# Patient Record
Sex: FEMALE | Race: WHITE | NOT HISPANIC OR LATINO | Employment: UNEMPLOYED | URBAN - METROPOLITAN AREA
[De-identification: names, ages, dates, MRNs, and addresses within clinical notes are randomized per-mention and may not be internally consistent; named-entity substitution may affect disease eponyms.]

---

## 2022-10-29 PROBLEM — R56.9 SEIZURES: Status: ACTIVE | Noted: 2018-02-24

## 2022-10-29 PROBLEM — N20.0 NEPHROLITHIASIS: Status: ACTIVE | Noted: 2022-10-29

## 2022-10-29 PROBLEM — N20.1 URETERAL STONE: Status: ACTIVE | Noted: 2022-10-29

## 2024-01-20 ENCOUNTER — HOSPITAL ENCOUNTER (EMERGENCY)
Facility: HOSPITAL | Age: 44
Discharge: HOME | End: 2024-01-21
Attending: STUDENT IN AN ORGANIZED HEALTH CARE EDUCATION/TRAINING PROGRAM

## 2024-01-20 DIAGNOSIS — N20.0 NEPHROLITHIASIS: Primary | ICD-10-CM

## 2024-01-20 LAB
ALBUMIN SERPL BCP-MCNC: 4.4 G/DL (ref 3.5–5.2)
ALP SERPL-CCNC: 69 U/L (ref 55–135)
ALT SERPL W/O P-5'-P-CCNC: 13 U/L (ref 10–44)
ANION GAP SERPL CALC-SCNC: 11 MMOL/L (ref 8–16)
AST SERPL-CCNC: 19 U/L (ref 10–40)
BASOPHILS # BLD AUTO: 0.06 K/UL (ref 0–0.2)
BASOPHILS NFR BLD: 0.4 % (ref 0–1.9)
BILIRUB SERPL-MCNC: 0.5 MG/DL (ref 0.1–1)
BUN SERPL-MCNC: 11 MG/DL (ref 6–20)
CALCIUM SERPL-MCNC: 10.1 MG/DL (ref 8.7–10.5)
CHLORIDE SERPL-SCNC: 108 MMOL/L (ref 95–110)
CO2 SERPL-SCNC: 20 MMOL/L (ref 23–29)
CREAT SERPL-MCNC: 0.9 MG/DL (ref 0.5–1.4)
DIFFERENTIAL METHOD BLD: ABNORMAL
EOSINOPHIL # BLD AUTO: 0.1 K/UL (ref 0–0.5)
EOSINOPHIL NFR BLD: 0.4 % (ref 0–8)
ERYTHROCYTE [DISTWIDTH] IN BLOOD BY AUTOMATED COUNT: 13.2 % (ref 11.5–14.5)
EST. GFR  (NO RACE VARIABLE): >60 ML/MIN/1.73 M^2
GLUCOSE SERPL-MCNC: 114 MG/DL (ref 70–110)
HCT VFR BLD AUTO: 37.5 % (ref 37–48.5)
HGB BLD-MCNC: 12.4 G/DL (ref 12–16)
IMM GRANULOCYTES # BLD AUTO: 0.06 K/UL (ref 0–0.04)
IMM GRANULOCYTES NFR BLD AUTO: 0.4 % (ref 0–0.5)
LIPASE SERPL-CCNC: 36 U/L (ref 4–60)
LYMPHOCYTES # BLD AUTO: 2.8 K/UL (ref 1–4.8)
LYMPHOCYTES NFR BLD: 18.3 % (ref 18–48)
MCH RBC QN AUTO: 29 PG (ref 27–31)
MCHC RBC AUTO-ENTMCNC: 33.1 G/DL (ref 32–36)
MCV RBC AUTO: 88 FL (ref 82–98)
MONOCYTES # BLD AUTO: 0.6 K/UL (ref 0.3–1)
MONOCYTES NFR BLD: 4 % (ref 4–15)
NEUTROPHILS # BLD AUTO: 11.5 K/UL (ref 1.8–7.7)
NEUTROPHILS NFR BLD: 76.5 % (ref 38–73)
NRBC BLD-RTO: 0 /100 WBC
PLATELET # BLD AUTO: 314 K/UL (ref 150–450)
PMV BLD AUTO: 11.5 FL (ref 9.2–12.9)
POTASSIUM SERPL-SCNC: 3.2 MMOL/L (ref 3.5–5.1)
PROT SERPL-MCNC: 8.2 G/DL (ref 6–8.4)
RBC # BLD AUTO: 4.28 M/UL (ref 4–5.4)
SODIUM SERPL-SCNC: 139 MMOL/L (ref 136–145)
WBC # BLD AUTO: 15.05 K/UL (ref 3.9–12.7)

## 2024-01-20 PROCEDURE — 81025 URINE PREGNANCY TEST: CPT | Performed by: PHYSICIAN ASSISTANT

## 2024-01-20 PROCEDURE — 63600175 PHARM REV CODE 636 W HCPCS: Performed by: PHYSICIAN ASSISTANT

## 2024-01-20 PROCEDURE — 96375 TX/PRO/DX INJ NEW DRUG ADDON: CPT

## 2024-01-20 PROCEDURE — 96374 THER/PROPH/DIAG INJ IV PUSH: CPT

## 2024-01-20 PROCEDURE — 83690 ASSAY OF LIPASE: CPT | Performed by: PHYSICIAN ASSISTANT

## 2024-01-20 PROCEDURE — 99285 EMERGENCY DEPT VISIT HI MDM: CPT | Mod: 25

## 2024-01-20 PROCEDURE — 80053 COMPREHEN METABOLIC PANEL: CPT | Performed by: PHYSICIAN ASSISTANT

## 2024-01-20 PROCEDURE — 85025 COMPLETE CBC W/AUTO DIFF WBC: CPT | Performed by: PHYSICIAN ASSISTANT

## 2024-01-20 RX ORDER — MORPHINE SULFATE 4 MG/ML
4 INJECTION, SOLUTION INTRAMUSCULAR; INTRAVENOUS
Status: COMPLETED | OUTPATIENT
Start: 2024-01-20 | End: 2024-01-20

## 2024-01-20 RX ORDER — ONDANSETRON HYDROCHLORIDE 2 MG/ML
4 INJECTION, SOLUTION INTRAVENOUS
Status: COMPLETED | OUTPATIENT
Start: 2024-01-20 | End: 2024-01-20

## 2024-01-20 RX ORDER — HYDROMORPHONE HYDROCHLORIDE 1 MG/ML
1 INJECTION, SOLUTION INTRAMUSCULAR; INTRAVENOUS; SUBCUTANEOUS
Status: COMPLETED | OUTPATIENT
Start: 2024-01-20 | End: 2024-01-20

## 2024-01-20 RX ADMIN — HYDROMORPHONE HYDROCHLORIDE 1 MG: 1 INJECTION, SOLUTION INTRAMUSCULAR; INTRAVENOUS; SUBCUTANEOUS at 11:01

## 2024-01-20 RX ADMIN — MORPHINE SULFATE 4 MG: 4 INJECTION INTRAVENOUS at 11:01

## 2024-01-20 RX ADMIN — ONDANSETRON 4 MG: 2 INJECTION INTRAMUSCULAR; INTRAVENOUS at 11:01

## 2024-01-20 NOTE — Clinical Note
"Marisol Romano" Manuel was seen and treated in our emergency department on 1/20/2024.  She may return to work on 01/24/2024.       If you have any questions or concerns, please don't hesitate to call.      Durga Leyva MD"

## 2024-01-21 VITALS
SYSTOLIC BLOOD PRESSURE: 123 MMHG | TEMPERATURE: 98 F | DIASTOLIC BLOOD PRESSURE: 67 MMHG | WEIGHT: 167 LBS | BODY MASS INDEX: 33.73 KG/M2 | OXYGEN SATURATION: 97 % | HEART RATE: 76 BPM | RESPIRATION RATE: 16 BRPM

## 2024-01-21 LAB
B-HCG UR QL: NEGATIVE
BACTERIA #/AREA URNS AUTO: ABNORMAL /HPF
BILIRUB UR QL STRIP: NEGATIVE
CLARITY UR REFRACT.AUTO: ABNORMAL
COLOR UR AUTO: ABNORMAL
CTP QC/QA: YES
GLUCOSE UR QL STRIP: NEGATIVE
HGB UR QL STRIP: ABNORMAL
HYALINE CASTS UR QL AUTO: 0 /LPF
KETONES UR QL STRIP: ABNORMAL
LEUKOCYTE ESTERASE UR QL STRIP: ABNORMAL
MICROSCOPIC COMMENT: ABNORMAL
NITRITE UR QL STRIP: NEGATIVE
PH UR STRIP: 6 [PH] (ref 5–8)
PROT UR QL STRIP: ABNORMAL
RBC #/AREA URNS AUTO: >100 /HPF (ref 0–4)
SP GR UR STRIP: 1.02 (ref 1–1.03)
SQUAMOUS #/AREA URNS AUTO: 6 /HPF
URN SPEC COLLECT METH UR: ABNORMAL
WBC #/AREA URNS AUTO: 7 /HPF (ref 0–5)

## 2024-01-21 PROCEDURE — 96361 HYDRATE IV INFUSION ADD-ON: CPT

## 2024-01-21 PROCEDURE — 81001 URINALYSIS AUTO W/SCOPE: CPT | Performed by: PHYSICIAN ASSISTANT

## 2024-01-21 PROCEDURE — 96376 TX/PRO/DX INJ SAME DRUG ADON: CPT

## 2024-01-21 PROCEDURE — 63600175 PHARM REV CODE 636 W HCPCS: Performed by: PHYSICIAN ASSISTANT

## 2024-01-21 PROCEDURE — 25000003 PHARM REV CODE 250: Performed by: PHYSICIAN ASSISTANT

## 2024-01-21 RX ORDER — KETOROLAC TROMETHAMINE 10 MG/1
10 TABLET, FILM COATED ORAL EVERY 6 HOURS
Qty: 20 TABLET | Refills: 0 | Status: SHIPPED | OUTPATIENT
Start: 2024-01-21 | End: 2024-01-26

## 2024-01-21 RX ORDER — TAMSULOSIN HYDROCHLORIDE 0.4 MG/1
0.4 CAPSULE ORAL DAILY
Qty: 14 CAPSULE | Refills: 0 | Status: SHIPPED | OUTPATIENT
Start: 2024-01-21 | End: 2024-02-04

## 2024-01-21 RX ORDER — HYDROMORPHONE HYDROCHLORIDE 1 MG/ML
1 INJECTION, SOLUTION INTRAMUSCULAR; INTRAVENOUS; SUBCUTANEOUS
Status: DISCONTINUED | OUTPATIENT
Start: 2024-01-21 | End: 2024-01-21 | Stop reason: HOSPADM

## 2024-01-21 RX ORDER — KETOROLAC TROMETHAMINE 30 MG/ML
15 INJECTION, SOLUTION INTRAMUSCULAR; INTRAVENOUS
Status: COMPLETED | OUTPATIENT
Start: 2024-01-21 | End: 2024-01-21

## 2024-01-21 RX ORDER — ONDANSETRON 4 MG/1
8 TABLET, ORALLY DISINTEGRATING ORAL EVERY 6 HOURS PRN
Qty: 30 TABLET | Refills: 0 | Status: SHIPPED | OUTPATIENT
Start: 2024-01-21

## 2024-01-21 RX ORDER — HYDROMORPHONE HYDROCHLORIDE 1 MG/ML
1 INJECTION, SOLUTION INTRAMUSCULAR; INTRAVENOUS; SUBCUTANEOUS
Status: COMPLETED | OUTPATIENT
Start: 2024-01-21 | End: 2024-01-21

## 2024-01-21 RX ORDER — OXYCODONE HYDROCHLORIDE 5 MG/1
5 TABLET ORAL EVERY 4 HOURS PRN
Qty: 12 TABLET | Refills: 0 | Status: SHIPPED | OUTPATIENT
Start: 2024-01-21 | End: 2024-01-24

## 2024-01-21 RX ORDER — TAMSULOSIN HYDROCHLORIDE 0.4 MG/1
0.4 CAPSULE ORAL
Status: COMPLETED | OUTPATIENT
Start: 2024-01-21 | End: 2024-01-21

## 2024-01-21 RX ADMIN — KETOROLAC TROMETHAMINE 15 MG: 30 INJECTION, SOLUTION INTRAMUSCULAR; INTRAVENOUS at 12:01

## 2024-01-21 RX ADMIN — HYDROMORPHONE HYDROCHLORIDE 1 MG: 1 INJECTION, SOLUTION INTRAMUSCULAR; INTRAVENOUS; SUBCUTANEOUS at 12:01

## 2024-01-21 RX ADMIN — TAMSULOSIN HYDROCHLORIDE 0.4 MG: 0.4 CAPSULE ORAL at 12:01

## 2024-01-21 RX ADMIN — SODIUM CHLORIDE, POTASSIUM CHLORIDE, SODIUM LACTATE AND CALCIUM CHLORIDE 1000 ML: 600; 310; 30; 20 INJECTION, SOLUTION INTRAVENOUS at 12:01

## 2024-01-21 RX ADMIN — POTASSIUM BICARBONATE 40 MEQ: 391 TABLET, EFFERVESCENT ORAL at 12:01

## 2024-01-21 NOTE — HPI
43F with hx of seizures, Crohns, nephrolithiasis presents to the ED with 10/10 L flank pain.  Urology consulted for L ureteral stone.      In the ED, she is AF and HDS.  WBC 15.  Cr 0.9 at baseline. UA neg nitrites, ***.  CTRSS shows 4 mm L UVJ stone with upstream hydronephrosis as well as 4 mm and 9 mm L lower pole stones and multiple punctate stones in the L kidney.  There is also a punctate R lower pole stone but no right hydronephrosis.

## 2024-01-21 NOTE — PROVIDER PROGRESS NOTES - EMERGENCY DEPT.
Encounter Date: 1/20/2024    ED Physician Progress Notes            Patient signed out to me at the change of shift. I refer you to the prior provider's history and physical examination for comprehensive evaluation. Remainder of my involvement in this patient's case will be dictated below.     DISCLAIMER: This note was prepared with [employer] voice recognition transcription software. Garbled syntax, mangled pronouns, and other bizarre constructions may be attributed to that software system.      In brief:    43-year-old female with a history of Crohn's disease presents with refractory pain due to renal colic and probable concomitant Crohn's flare.  On reassessment, pain is still present.  She is received multiple doses of opioid medications.  I offered the patient admission to the hospital for refractory pain to which she accepted.  I consulted the Urology who evaluated the patient and the patient had a change of heart and would prefer to go home.  I discussed this with the patient and confirmed that she would prefer to go home.  I gave her strict return precautions including watching out for fevers, nausea vomiting or diarrhea, dysuria or worsening pain.  She acknowledged and verbalized agreement.

## 2024-01-21 NOTE — DISCHARGE INSTRUCTIONS

## 2024-01-21 NOTE — ED TRIAGE NOTES
Marisol Martinler, a 43 y.o. female presents to the ED w/ complaint of left sided flank pain radiating to her groin. Patient endorses 10/10 pain. Patient has hx of kidney stones and stents. Patient denies C/P,SOB. Patient has hx of crohns disease    Triage note:  Chief Complaint   Patient presents with    Flank Pain     Left sided flank pain radiating to groin 10/10, Hx of kidney stones and stents.      Review of patient's allergies indicates:  No Known Allergies  Past Medical History:   Diagnosis Date    Crohn's disease     Seizures

## 2024-01-21 NOTE — ED PROVIDER NOTES
Encounter Date: 2024       History     Chief Complaint   Patient presents with    Flank Pain     Left sided flank pain radiating to groin 10/10, Hx of kidney stones and stents.      43-year-old female with past medical history of Crohn's disease, seizures presents to the ED for left flank pain.  States yesterday she has been having sharp intermittent left flank pain that radiates down to her left groin.  Denies any fevers/chills, dysuria, hematuria, rectal bleeding, diarrhea or constipation.  States she does have history of kidney stones in the past as well.  States pain is progressively worsening currently rates it a 10/10.    The history is provided by the patient.     Review of patient's allergies indicates:  No Known Allergies  Past Medical History:   Diagnosis Date    Crohn's disease     Seizures      Past Surgical History:   Procedure Laterality Date    BOWEL RESECTION      CARPAL TUNNEL RELEASE       SECTION      CYSTOSCOPY W/ RETROGRADES N/A 10/28/2022    Procedure: CYSTOSCOPY, WITH RETROGRADE PYELOGRAM;  Surgeon: Delio Posada MD;  Location: [facility] OR;  Service: Urology;  Laterality: N/A;    CYSTOSCOPY W/ URETERAL STENT PLACEMENT Right 10/28/2022    Procedure: CYSTOSCOPY, WITH URETERAL STENT INSERTION;  Surgeon: Delio Posada MD;  Location: [facility] OR;  Service: Urology;  Laterality: Right;    LASER LITHOTRIPSY  10/28/2022    Procedure: LITHOTRIPSY, USING LASER;  Surgeon: Delio Posada MD;  Location: [facility] OR;  Service: Urology;;     History reviewed. No pertinent family history.  Social History     Tobacco Use    Smokeless tobacco: Never   Substance Use Topics    Alcohol use: Never     Review of Systems   Constitutional:  Negative for chills and fever.   HENT:  Negative for sore throat.    Respiratory:  Negative for cough and shortness of breath.    Cardiovascular:  Negative for chest pain.   Gastrointestinal:  Positive for abdominal pain and nausea. Negative for vomiting.    Genitourinary:  Negative for difficulty urinating, dysuria, vaginal bleeding and vaginal discharge.   Musculoskeletal:  Negative for back pain.   Skin: Negative.    Neurological:  Negative for weakness.   Psychiatric/Behavioral:  Negative for confusion.        Physical Exam     Initial Vitals   BP Pulse Resp Temp SpO2   01/20/24 2244 01/20/24 2244 01/20/24 2244 01/20/24 2245 01/20/24 2244   (!) 178/102 90 15 97.8 °F (36.6 °C) 97 %      MAP       --                Physical Exam    Nursing note and vitals reviewed.  Constitutional: She appears well-developed and well-nourished. She appears distressed.   Uncomfortable appearing.   Eyes: Conjunctivae are normal.   Neck: Neck supple.   Normal range of motion.  Cardiovascular:  Normal rate.           Pulmonary/Chest: Breath sounds normal.   Abdominal: Abdomen is soft. There is no abdominal tenderness.   Musculoskeletal:         General: Normal range of motion.      Cervical back: Normal range of motion and neck supple.     Neurological: She is alert and oriented to person, place, and time. GCS score is 15. GCS eye subscore is 4. GCS verbal subscore is 5. GCS motor subscore is 6.   Skin: Skin is warm and dry. Capillary refill takes less than 2 seconds.         ED Course   Procedures  Labs Reviewed   CBC W/ AUTO DIFFERENTIAL - Abnormal; Notable for the following components:       Result Value    WBC 15.05 (*)     Gran # (ANC) 11.5 (*)     Immature Grans (Abs) 0.06 (*)     Gran % 76.5 (*)     All other components within normal limits   COMPREHENSIVE METABOLIC PANEL - Abnormal; Notable for the following components:    Potassium 3.2 (*)     CO2 20 (*)     Glucose 114 (*)     All other components within normal limits   LIPASE   URINALYSIS, REFLEX TO URINE CULTURE   POCT URINE PREGNANCY          Imaging Results              CT Renal Stone Study ABD Pelvis WO (Final result)  Result time 01/20/24 23:40:18      Final result by Paula Beltre MD (01/20/24 23:40:18)                    Impression:      4 x 3 mm left ureterovesical calculus with associated mild left hydroureteronephrosis.  Bilateral nephrolithiasis.    Nonspecific dilatation of a very short segment of the distal ileum excluding the terminal ileum.    Old granulomatous disease.      Electronically signed by: Paula Beltre  Date:    01/20/2024  Time:    23:40               Narrative:    EXAMINATION:  CT RENAL STONE STUDY ABDOMEN PELVIS WITHOUT    CLINICAL HISTORY:  Left-sided flank pain.    TECHNIQUE:  5 mm unenhanced axial images from the lung bases through the greater trochanters were performed.  Coronal and sagittal reformatted images were provided.    COMPARISON:  None.    FINDINGS:  Within the limits of a noncontrast examination, the liver, spleen, pancreas, and adrenal glands are unremarkable.  The gallbladder contains no calcified gallstones.  Calcified splenic granulomas are seen in both the liver and spleen.    Mild left hydroureteronephrosis is present secondary to a 4 x 3 mm ureterovesical junction calculus.  Bilateral nephrolithiasis is present.  The largest calculus is seen in the left renal pelvis measuring approximately 9 x 6 mm.    There is no gross abdominal adenopathy or ascites. There is a small fat containing umbilical hernia.    And ileo colic anastomosis is present.  A dilated fluid and air fluid level containing very short segment of the distal ileum is seen just proximal to the terminal ileum.  A nonspecific 12 mm nodular density with partial peripheral calcification is seen in the ventral right paramedian upper pelvis.  There is no free pelvic fluid.    The lung bases are essentially clear.                                       Medications   lactated ringers bolus 1,000 mL (1,000 mLs Intravenous New Bag 1/21/24 0014)   ondansetron injection 4 mg (4 mg Intravenous Given 1/20/24 2310)   morphine injection 4 mg (4 mg Intravenous Given 1/20/24 2311)   HYDROmorphone injection 1 mg (1 mg Intravenous Given  1/20/24 2340)   tamsulosin 24 hr capsule 0.4 mg (0.4 mg Oral Given 1/21/24 0015)   ketorolac injection 15 mg (15 mg Intravenous Given 1/21/24 0012)   potassium bicarbonate disintegrating tablet 40 mEq (40 mEq Oral Given 1/21/24 0048)   HYDROmorphone injection 1 mg (1 mg Intravenous Given 1/21/24 0047)     Medical Decision Making  43-year-old female presents ED for left-sided flank pain that radiates to the left groin.    Differential includes but not limited to pyelonephritis, nephrolithiasis, Crohn's flare, AMMON    Patient is in a considerable amount of pain on my exam.  CT renal stone study shows a 4 x 3 left UVJ stone.  No AMMON on labs.  She did require multiple doses of IV analgesia.  At shift change she is pending UA.  Patient care and I have given to ED team who will follow-up on UA.      Amount and/or Complexity of Data Reviewed  Labs: ordered. Decision-making details documented in ED Course.  Radiology: ordered.    Risk  Prescription drug management.               ED Course as of 01/21/24 0101   Sat Jan 20, 2024   2346 Creatinine: 0.9 [HJ]   Sun Jan 21, 2024   0016 WBC(!): 15.05 [HJ]   0016 Creatinine: 0.9 [HJ]      ED Course User Index  [HJ] Diana Wallis PA-C                           Clinical Impression:  Final diagnoses:  [N20.0] Nephrolithiasis (Primary)                 Diana Wallis PA-C  01/21/24 0101

## 2024-05-15 ENCOUNTER — HOSPITAL ENCOUNTER (EMERGENCY)
Facility: HOSPITAL | Age: 44
Discharge: HOME | End: 2024-05-15
Attending: EMERGENCY MEDICINE

## 2024-05-15 VITALS
BODY MASS INDEX: 25.2 KG/M2 | HEIGHT: 59 IN | OXYGEN SATURATION: 100 % | WEIGHT: 125 LBS | RESPIRATION RATE: 18 BRPM | DIASTOLIC BLOOD PRESSURE: 73 MMHG | TEMPERATURE: 99 F | HEART RATE: 60 BPM | SYSTOLIC BLOOD PRESSURE: 140 MMHG

## 2024-05-15 DIAGNOSIS — N20.1 URETEROLITHIASIS: Primary | ICD-10-CM

## 2024-05-15 LAB
ALBUMIN SERPL BCP-MCNC: 3.6 G/DL (ref 3.5–5.2)
ALP SERPL-CCNC: 63 U/L (ref 55–135)
ALT SERPL W/O P-5'-P-CCNC: 9 U/L (ref 10–44)
ANION GAP SERPL CALC-SCNC: 9 MMOL/L (ref 8–16)
AST SERPL-CCNC: 14 U/L (ref 10–40)
B-HCG UR QL: NEGATIVE
BACTERIA #/AREA URNS AUTO: ABNORMAL /HPF
BACTERIA #/AREA URNS AUTO: ABNORMAL /HPF
BASOPHILS # BLD AUTO: 0.04 K/UL (ref 0–0.2)
BASOPHILS NFR BLD: 0.4 % (ref 0–1.9)
BILIRUB SERPL-MCNC: 0.2 MG/DL (ref 0.1–1)
BILIRUB UR QL STRIP: NEGATIVE
BILIRUB UR QL STRIP: NEGATIVE
BUN SERPL-MCNC: 10 MG/DL (ref 6–20)
CALCIUM SERPL-MCNC: 9 MG/DL (ref 8.7–10.5)
CHLORIDE SERPL-SCNC: 107 MMOL/L (ref 95–110)
CLARITY UR REFRACT.AUTO: ABNORMAL
CLARITY UR REFRACT.AUTO: ABNORMAL
CO2 SERPL-SCNC: 24 MMOL/L (ref 23–29)
COLOR UR AUTO: YELLOW
COLOR UR AUTO: YELLOW
CREAT SERPL-MCNC: 0.8 MG/DL (ref 0.5–1.4)
CTP QC/QA: YES
DIFFERENTIAL METHOD BLD: ABNORMAL
EOSINOPHIL # BLD AUTO: 0.1 K/UL (ref 0–0.5)
EOSINOPHIL NFR BLD: 0.7 % (ref 0–8)
ERYTHROCYTE [DISTWIDTH] IN BLOOD BY AUTOMATED COUNT: 13.4 % (ref 11.5–14.5)
EST. GFR  (NO RACE VARIABLE): >60 ML/MIN/1.73 M^2
GLUCOSE SERPL-MCNC: 109 MG/DL (ref 70–110)
GLUCOSE UR QL STRIP: NEGATIVE
GLUCOSE UR QL STRIP: NEGATIVE
HCT VFR BLD AUTO: 33.2 % (ref 37–48.5)
HCV AB SERPL QL IA: NORMAL
HGB BLD-MCNC: 10.6 G/DL (ref 12–16)
HGB UR QL STRIP: ABNORMAL
HGB UR QL STRIP: ABNORMAL
HIV 1+2 AB+HIV1 P24 AG SERPL QL IA: NORMAL
HYALINE CASTS UR QL AUTO: 0 /LPF
HYALINE CASTS UR QL AUTO: 0 /LPF
IMM GRANULOCYTES # BLD AUTO: 0.04 K/UL (ref 0–0.04)
IMM GRANULOCYTES NFR BLD AUTO: 0.4 % (ref 0–0.5)
KETONES UR QL STRIP: ABNORMAL
KETONES UR QL STRIP: NEGATIVE
LEUKOCYTE ESTERASE UR QL STRIP: ABNORMAL
LEUKOCYTE ESTERASE UR QL STRIP: NEGATIVE
LIPASE SERPL-CCNC: 42 U/L (ref 4–60)
LYMPHOCYTES # BLD AUTO: 1.8 K/UL (ref 1–4.8)
LYMPHOCYTES NFR BLD: 17.4 % (ref 18–48)
MCH RBC QN AUTO: 27.9 PG (ref 27–31)
MCHC RBC AUTO-ENTMCNC: 31.9 G/DL (ref 32–36)
MCV RBC AUTO: 87 FL (ref 82–98)
MICROSCOPIC COMMENT: ABNORMAL
MICROSCOPIC COMMENT: ABNORMAL
MONOCYTES # BLD AUTO: 0.5 K/UL (ref 0.3–1)
MONOCYTES NFR BLD: 4.3 % (ref 4–15)
NEUTROPHILS # BLD AUTO: 8 K/UL (ref 1.8–7.7)
NEUTROPHILS NFR BLD: 76.8 % (ref 38–73)
NITRITE UR QL STRIP: NEGATIVE
NITRITE UR QL STRIP: NEGATIVE
NRBC BLD-RTO: 0 /100 WBC
PH UR STRIP: 6 [PH] (ref 5–8)
PH UR STRIP: 6 [PH] (ref 5–8)
PLATELET # BLD AUTO: 255 K/UL (ref 150–450)
PMV BLD AUTO: 10.9 FL (ref 9.2–12.9)
POTASSIUM SERPL-SCNC: 3.9 MMOL/L (ref 3.5–5.1)
PROT SERPL-MCNC: 7 G/DL (ref 6–8.4)
PROT UR QL STRIP: ABNORMAL
PROT UR QL STRIP: ABNORMAL
RBC # BLD AUTO: 3.8 M/UL (ref 4–5.4)
RBC #/AREA URNS AUTO: >100 /HPF (ref 0–4)
RBC #/AREA URNS AUTO: >100 /HPF (ref 0–4)
SODIUM SERPL-SCNC: 140 MMOL/L (ref 136–145)
SP GR UR STRIP: 1.02 (ref 1–1.03)
SP GR UR STRIP: 1.03 (ref 1–1.03)
SQUAMOUS #/AREA URNS AUTO: 23 /HPF
SQUAMOUS #/AREA URNS AUTO: 6 /HPF
URN SPEC COLLECT METH UR: ABNORMAL
URN SPEC COLLECT METH UR: ABNORMAL
WBC # BLD AUTO: 10.46 K/UL (ref 3.9–12.7)
WBC #/AREA URNS AUTO: 10 /HPF (ref 0–5)
WBC #/AREA URNS AUTO: 2 /HPF (ref 0–5)

## 2024-05-15 PROCEDURE — 96374 THER/PROPH/DIAG INJ IV PUSH: CPT

## 2024-05-15 PROCEDURE — 80053 COMPREHEN METABOLIC PANEL: CPT | Performed by: PHYSICIAN ASSISTANT

## 2024-05-15 PROCEDURE — 96376 TX/PRO/DX INJ SAME DRUG ADON: CPT

## 2024-05-15 PROCEDURE — 81001 URINALYSIS AUTO W/SCOPE: CPT | Performed by: PHYSICIAN ASSISTANT

## 2024-05-15 PROCEDURE — 86803 HEPATITIS C AB TEST: CPT | Performed by: PHYSICIAN ASSISTANT

## 2024-05-15 PROCEDURE — 63600175 PHARM REV CODE 636 W HCPCS: Performed by: EMERGENCY MEDICINE

## 2024-05-15 PROCEDURE — 81025 URINE PREGNANCY TEST: CPT | Performed by: PHYSICIAN ASSISTANT

## 2024-05-15 PROCEDURE — 96361 HYDRATE IV INFUSION ADD-ON: CPT

## 2024-05-15 PROCEDURE — 81001 URINALYSIS AUTO W/SCOPE: CPT | Mod: 91 | Performed by: EMERGENCY MEDICINE

## 2024-05-15 PROCEDURE — 83690 ASSAY OF LIPASE: CPT | Performed by: PHYSICIAN ASSISTANT

## 2024-05-15 PROCEDURE — 63600175 PHARM REV CODE 636 W HCPCS: Performed by: PHYSICIAN ASSISTANT

## 2024-05-15 PROCEDURE — 87389 HIV-1 AG W/HIV-1&-2 AB AG IA: CPT | Performed by: PHYSICIAN ASSISTANT

## 2024-05-15 PROCEDURE — 85025 COMPLETE CBC W/AUTO DIFF WBC: CPT | Performed by: PHYSICIAN ASSISTANT

## 2024-05-15 PROCEDURE — 99285 EMERGENCY DEPT VISIT HI MDM: CPT | Mod: 25

## 2024-05-15 PROCEDURE — 96375 TX/PRO/DX INJ NEW DRUG ADDON: CPT

## 2024-05-15 RX ORDER — HYDROCODONE BITARTRATE AND ACETAMINOPHEN 5; 325 MG/1; MG/1
1 TABLET ORAL EVERY 6 HOURS PRN
Qty: 12 TABLET | Refills: 0 | Status: SHIPPED | OUTPATIENT
Start: 2024-05-15

## 2024-05-15 RX ORDER — MORPHINE SULFATE 4 MG/ML
4 INJECTION, SOLUTION INTRAMUSCULAR; INTRAVENOUS
Status: COMPLETED | OUTPATIENT
Start: 2024-05-15 | End: 2024-05-15

## 2024-05-15 RX ORDER — KETOROLAC TROMETHAMINE 30 MG/ML
10 INJECTION, SOLUTION INTRAMUSCULAR; INTRAVENOUS
Status: COMPLETED | OUTPATIENT
Start: 2024-05-15 | End: 2024-05-15

## 2024-05-15 RX ORDER — TAMSULOSIN HYDROCHLORIDE 0.4 MG/1
0.4 CAPSULE ORAL DAILY
Qty: 7 CAPSULE | Refills: 0 | Status: SHIPPED | OUTPATIENT
Start: 2024-05-15

## 2024-05-15 RX ORDER — ONDANSETRON HYDROCHLORIDE 2 MG/ML
4 INJECTION, SOLUTION INTRAVENOUS
Status: COMPLETED | OUTPATIENT
Start: 2024-05-15 | End: 2024-05-15

## 2024-05-15 RX ADMIN — SODIUM CHLORIDE, POTASSIUM CHLORIDE, SODIUM LACTATE AND CALCIUM CHLORIDE 1000 ML: 600; 310; 30; 20 INJECTION, SOLUTION INTRAVENOUS at 02:05

## 2024-05-15 RX ADMIN — MORPHINE SULFATE 4 MG: 4 INJECTION INTRAVENOUS at 05:05

## 2024-05-15 RX ADMIN — MORPHINE SULFATE 4 MG: 4 INJECTION INTRAVENOUS at 02:05

## 2024-05-15 RX ADMIN — KETOROLAC TROMETHAMINE 10 MG: 30 INJECTION, SOLUTION INTRAMUSCULAR at 02:05

## 2024-05-15 RX ADMIN — ONDANSETRON 4 MG: 2 INJECTION INTRAMUSCULAR; INTRAVENOUS at 02:05

## 2024-05-15 NOTE — ED TRIAGE NOTES
Marisol Jackson, a 44 y.o. female presents to the ED w/ complaint of flank pain. Reports left flank pain starting 1 hour ago. Hx kidney stones    Adult Physical Assessment  LOC: Marisol Jackson, 44 y.o. female verified via two identifiers.  The patient is awake, alert, oriented and speaking appropriately at this time.  APPEARANCE: Patient resting comfortably and appears to be in no acute distress at this time. Patient is clean and well groomed, patient's clothing is properly fastened.  SKIN:The skin is warm and dry, color consistent with ethnicity, patient has normal skin turgor and moist mucus membranes, skin intact, no breakdown or brusing noted.  MUSCULOSKELETAL: Patient moving all extremities well, no obvious swelling or deformities noted.  RESPIRATORY: Airway is open and patent, respirations are spontaneous, patient has a normal effort and rate, no accessory muscle use noted.  CARDIAC: Patient has a normal rate and rhythm, no periphreal edema noted in any extremity, capillary refill < 3 seconds in all extremities  ABDOMEN: Soft and non tender to palpation, no abdominal distention noted. Bowel sounds present in all four quadrants. Reports left sided flank pain x 1 hour. Hx of kidney stones  NEUROLOGIC: Eyes open spontaneously, behavior appropriate to situation, follows commands, facial expression symmetrical, bilateral hand grasp equal and even, purposeful motor response noted, normal sensation in all extremities when touched with a finger.      Triage note:  Chief Complaint   Patient presents with    Flank Pain     Left flank pain, hx of the same with kidney stones, pain x 1 hour     Review of patient's allergies indicates:  No Known Allergies  Past Medical History:   Diagnosis Date    Crohn's disease     Seizures

## 2024-05-15 NOTE — Clinical Note
"Marisol Pegueroyandy" Manuel was seen and treated in our emergency department on 5/15/2024.  She may return to work on 05/16/2024.       If you have any questions or concerns, please don't hesitate to call.      Cally Cummings RN    "

## 2024-05-20 NOTE — ED PROVIDER NOTES
History:  Marisol Jackson is a 44 y.o. female who presents to the ED with Flank Pain (Left flank pain, hx of the same with kidney stones, pain x 1 hour)    Described as 44-year-old female with a history of Crohn's disease, kidney stones requiring stent placement in the past presenting to the emergency department with flank pain.  She reports that for the past hour she was had severe stabbing type pain in her left flank, feeling like prior kidney stones.  No fevers or chills.  No dysuria.    Review of Systems: All systems reviewed and are negative except as per history of present illness.    Medications:   Discharge Medication List as of 5/15/2024  6:24 AM        CONTINUE these medications which have NOT CHANGED    Details   acetaminophen (TYLENOL) 325 MG tablet Take 2 tablets (650 mg total) by mouth every 6 (six) hours as needed for Pain., Starting Sat 10/29/2022, No Print      busPIRone (BUSPAR) 5 MG Tab Take 1 tablet (5 mg total) by mouth 2 (two) times daily., Starting Sat 10/29/2022, Until 2023, Normal      ondansetron (ZOFRAN-ODT) 4 MG TbDL Take 2 tablets (8 mg total) by mouth every 6 (six) hours as needed (nausea)., Starting Sun 2024, Print             PMH:   Past Medical History:   Diagnosis Date    Crohn's disease     Seizures      PSH:   Past Surgical History:   Procedure Laterality Date    BOWEL RESECTION      CARPAL TUNNEL RELEASE       SECTION      CYSTOSCOPY W/ RETROGRADES N/A 10/28/2022    Procedure: CYSTOSCOPY, WITH RETROGRADE PYELOGRAM;  Surgeon: Delio Posada MD;  [facility];  Service: Urology;  Laterality: N/A;    CYSTOSCOPY W/ URETERAL STENT PLACEMENT Right 10/28/2022    Procedure: CYSTOSCOPY, WITH URETERAL STENT INSERTION;  Surgeon: Delio Posada MD;  [facility];  Service: Urology;  Laterality: Right;    LASER LITHOTRIPSY  10/28/2022    Procedure: LITHOTRIPSY, USING LASER;  Surgeon: Delio Posada MD;  [facility];  Service: Urology;;      Allergies: She has No Known Allergies.  Social History: Marital Status: . She  reports that she has never smoked. She has never used smokeless tobacco.. She  reports no history of alcohol use..       Exam:  VITAL SIGNS:   Vitals:    05/15/24 0223 05/15/24 0310 05/15/24 0521 05/15/24 0636   BP:  131/88  (!) 140/73   BP Location:       Patient Position:       Pulse:  62  60   Resp: 16 18 18 18   Temp:  98.5 °F (36.9 °C)  98.7 °F (37.1 °C)   TempSrc:  Oral     SpO2:  100%  100%   Weight:       Height:         Const: Awake and alert, appears uncomfortable  Head: Atraumatic  Eyes: Normal Conjunctiva  ENT: Normal External Ears, Nose and Mouth.  Neck: Full range of motion. No meningismus.  Resp: Normal respiratory effort, No distress  Cardio: Equal and intact distal pulses  Abd: Soft, non tender, non distended.  No CVA tenderness to palpation   Skin: No petechiae or rashes  Ext: No cyanosis, or edema  Neur: Awake and alert  Psych: Normal Mood and Affect    Data:  Results for orders placed or performed during the hospital encounter of 05/15/24   HIV 1/2 Ag/Ab (4th Gen)   Result Value Ref Range    HIV 1/2 Ag/Ab Non-reactive Non-reactive   Hepatitis C Antibody   Result Value Ref Range    Hepatitis C Ab Non-reactive Non-reactive   CBC Auto Differential   Result Value Ref Range    WBC 10.46 3.90 - 12.70 K/uL    RBC 3.80 (L) 4.00 - 5.40 M/uL    Hemoglobin 10.6 (L) 12.0 - 16.0 g/dL    Hematocrit 33.2 (L) 37.0 - 48.5 %    MCV 87 82 - 98 fL    MCH 27.9 27.0 - 31.0 pg    MCHC 31.9 (L) 32.0 - 36.0 g/dL    RDW 13.4 11.5 - 14.5 %    Platelets 255 150 - 450 K/uL    MPV 10.9 9.2 - 12.9 fL    Immature Granulocytes 0.4 0.0 - 0.5 %    Gran # (ANC) 8.0 (H) 1.8 - 7.7 K/uL    Immature Grans (Abs) 0.04 0.00 - 0.04 K/uL    Lymph # 1.8 1.0 - 4.8 K/uL    Mono # 0.5 0.3 - 1.0 K/uL    Eos # 0.1 0.0 - 0.5 K/uL    Baso # 0.04 0.00 - 0.20 K/uL    nRBC 0 0 /100 WBC    Gran % 76.8 (H) 38.0 - 73.0 %    Lymph % 17.4 (L) 18.0 - 48.0 %    Mono %  4.3 4.0 - 15.0 %    Eosinophil % 0.7 0.0 - 8.0 %    Basophil % 0.4 0.0 - 1.9 %    Differential Method Automated    Comprehensive Metabolic Panel   Result Value Ref Range    Sodium 140 136 - 145 mmol/L    Potassium 3.9 3.5 - 5.1 mmol/L    Chloride 107 95 - 110 mmol/L    CO2 24 23 - 29 mmol/L    Glucose 109 70 - 110 mg/dL    BUN 10 6 - 20 mg/dL    Creatinine 0.8 0.5 - 1.4 mg/dL    Calcium 9.0 8.7 - 10.5 mg/dL    Total Protein 7.0 6.0 - 8.4 g/dL    Albumin 3.6 3.5 - 5.2 g/dL    Total Bilirubin 0.2 0.1 - 1.0 mg/dL    Alkaline Phosphatase 63 55 - 135 U/L    AST 14 10 - 40 U/L    ALT 9 (L) 10 - 44 U/L    eGFR >60.0 >60 mL/min/1.73 m^2    Anion Gap 9 8 - 16 mmol/L   Lipase   Result Value Ref Range    Lipase 42 4 - 60 U/L   Urinalysis, Reflex to Urine Culture Urine, Clean Catch    Specimen: Urine   Result Value Ref Range    Specimen UA Urine, Clean Catch     Color, UA Yellow Yellow, Straw, Leslie    Appearance, UA Hazy (A) Clear    pH, UA 6.0 5.0 - 8.0    Specific Gravity, UA 1.030 1.005 - 1.030    Protein, UA 1+ (A) Negative    Glucose, UA Negative Negative    Ketones, UA Trace (A) Negative    Bilirubin (UA) Negative Negative    Occult Blood UA 3+ (A) Negative    Nitrite, UA Negative Negative    Leukocytes, UA Trace (A) Negative   Urinalysis Microscopic   Result Value Ref Range    RBC, UA >100 (H) 0 - 4 /hpf    WBC, UA 10 (H) 0 - 5 /hpf    Bacteria Rare None-Occ /hpf    Squam Epithel, UA 23 /hpf    Hyaline Casts, UA 0 0-1/lpf /lpf    Microscopic Comment SEE COMMENT    Urinalysis, Reflex to Urine Culture Urine, Clean Catch    Specimen: Urine   Result Value Ref Range    Specimen UA Urine, Clean Catch     Color, UA Yellow Yellow, Straw, Leslie    Appearance, UA Hazy (A) Clear    pH, UA 6.0 5.0 - 8.0    Specific Gravity, UA 1.025 1.005 - 1.030    Protein, UA 1+ (A) Negative    Glucose, UA Negative Negative    Ketones, UA Negative Negative    Bilirubin (UA) Negative Negative    Occult Blood UA 3+ (A) Negative    Nitrite, UA  Negative Negative    Leukocytes, UA Negative Negative   Urinalysis Microscopic   Result Value Ref Range    RBC, UA >100 (H) 0 - 4 /hpf    WBC, UA 2 0 - 5 /hpf    Bacteria Rare None-Occ /hpf    Squam Epithel, UA 6 /hpf    Hyaline Casts, UA 0 0-1/lpf /lpf    Microscopic Comment SEE COMMENT    POCT urine pregnancy   Result Value Ref Range    POC Preg Test, Ur Negative Negative     Acceptable Yes      Imaging Results               CT Renal Stone Study ABD Pelvis WO (Final result)  Result time 05/15/24 05:47:51      Final result by Brandon Pereyra MD (05/15/24 05:47:51)                   Impression:      3 mm obstructing calculus in the mid-lumbar segment of the left ureter, with proximal ureteral dilatation, periureteral inflammatory changes, and left pelvocaliectasis.  Additional nonobstructing 1.0 cm stone in the left renal pelvis.    Numerous granulomatous calcifications in the liver, spleen, and multiple lymph nodes.    Postsurgical changes of large bowel.    Mild hepatomegaly.    Additional findings as detailed in the body of the report.    This report was flagged in Epic as abnormal.    Electronically signed by resident: Judith Bueno  Date:    05/15/2024  Time:    05:19    Electronically signed by: Brandon Pereyra  Date:    05/15/2024  Time:    05:47               Narrative:    EXAMINATION:  CT RENAL STONE STUDY ABD PELVIS WO    CLINICAL HISTORY:  Flank pain, kidney stone suspected;    TECHNIQUE:  Axial images of the abdomen and pelvis were acquired without IV contrast. No oral contrast was administered.  Coronal and sagittal reconstructions were also obtained.    COMPARISON:  CT 01/20/2024; 10/28/2022    FINDINGS:  Heart: Normal heart size.  No pericardial fluid.    Lungs: Lungs are symmetrically expanded.  Linear bandlike opacities suggestive of atelectasis/scarring.  Mild bibasilar subsegmental atelectasis.  No large focal consolidation.  No pleural effusion or pneumothorax.    Liver: Mildly  enlarged measuring 17.5 cm in craniocaudal dimension.  Normal contour.  Scattered calcified granulomas, unchanged.  No new focal hepatic lesions.    Gallbladder: No calcified gallstones.    Bile Ducts: No intrahepatic or extrahepatic biliary ductal dilatation.    Pancreas: No mass or peripancreatic fat stranding.    Spleen: Normal size and attenuation.  Multiple scattered calcified granulomas, unchanged.    Adrenals: No significant abnormalities.    Kidneys/Ureters: Within the mid-lumbar segment of the left ureter, there is an obstructing 3 mm renal stone with surrounding periureteral soft tissue stranding and proximal ureteral dilatation.    There is also an approximately 1.0 cm nonobstructing left renal pelvic stone.    No stones in the right renal collecting system.    Bladder: No evidence of wall thickening.    Reproductive organs: Uterus is present and unremarkable.  Prominent endometrial fluid, nonspecific.  No adnexal masses.    Peritoneum: Small volume free fluid dependently located in the pelvis.    Retroperitoneum: No pathologically enlarged retroperitoneal lymph nodes.  Multiple calcified periportal and mesenteric lymph nodes (2-52).    Bowel/Mesentery: Stomach is unremarkable. Postsurgical changes within the large bowel.  Small and large bowel are normal in caliber without evidence of obstruction or inflammatory changes.  Appendix is not definitively visualized likely removed.  No pericecal soft tissue stranding.    Abdominal wall:  Small fat containing umbilical hernia.    Vasculature: No aneurysm. No significant calcific atherosclerosis.    Bones: Mild degenerative changes of the visualized spine with prominent Schmorl's nodes versus remote wedge compression fractures of the lower thoracic spine.  No acute fractures.  No suspicious osseous lesions.                                    Labs & Imaging studies were reviewed independently by me.     Medical Decision Makin-year-old female presenting to  the emergency department with flank pain, feeling like a prior kidney stone.  She was given morphine, IV fluids, and Toradol for pain control.  Did require repeat dose of morphine.  UA negative for infection, creatinine normal, basic laboratory studies stable at baseline.  CT imaging reveals a 3 mm ureteral stone, likely the source of pain.  She incidentally has a 1 cm stone in her renal pelvis, though I do not believe this is the cause of her symptoms.  She was referred to Urology and given a strainer.  Pain is well-controlled on re-evaluation.  She was stable for discharge home with outpatient urology follow up and trial of passage.    Clinical Impression:  1. Ureterolithiasis             Medication List        START taking these medications      HYDROcodone-acetaminophen 5-325 mg per tablet  Commonly known as: NORCO  Take 1 tablet by mouth every 6 (six) hours as needed for Pain.            CHANGE how you take these medications      * tamsulosin 0.4 mg Cap  Commonly known as: FLOMAX  Take 1 capsule (0.4 mg total) by mouth once daily. for 14 days  What changed: Another medication with the same name was added. Make sure you understand how and when to take each.     * tamsulosin 0.4 mg Cap  Commonly known as: FLOMAX  Take 1 capsule (0.4 mg total) by mouth once daily.  What changed: You were already taking a medication with the same name, and this prescription was added. Make sure you understand how and when to take each.           * This list has 2 medication(s) that are the same as other medications prescribed for you. Read the directions carefully, and ask your doctor or other care provider to review them with you.                ASK your doctor about these medications      acetaminophen 325 MG tablet  Commonly known as: TYLENOL  Take 2 tablets (650 mg total) by mouth every 6 (six) hours as needed for Pain.     busPIRone 5 MG Tab  Commonly known as: BUSPAR  Take 1 tablet (5 mg total) by mouth 2 (two) times daily.      ondansetron 4 MG Tbdl  Commonly known as: ZOFRAN-ODT  Take 2 tablets (8 mg total) by mouth every 6 (six) hours as needed (nausea).               Where to Get Your Medications        These medications were sent to [id number] - [place] - [address] AT [facility] OF [address], [place] [address]      Phone: [phone]   HYDROcodone-acetaminophen 5-325 mg per tablet  tamsulosin 0.4 mg Cap         Follow-up Information       [facility] [address]. Schedule an appointment as soon as possible for a visit in 1 week.    Specialty: Urology  Contact information:  [address]  [facility] [address] [phone]  Additional information:  Main Building, 4th Floor   Please park in [facility] and take Atrium elevator                             Renetta Rios MD  05/20/24 0055

## 2024-05-26 ENCOUNTER — HOSPITAL ENCOUNTER (EMERGENCY)
Facility: HOSPITAL | Age: 44
Discharge: HOME | End: 2024-05-26
Attending: EMERGENCY MEDICINE

## 2024-05-26 VITALS
TEMPERATURE: 98 F | RESPIRATION RATE: 16 BRPM | DIASTOLIC BLOOD PRESSURE: 75 MMHG | OXYGEN SATURATION: 100 % | WEIGHT: 130 LBS | HEART RATE: 61 BPM | SYSTOLIC BLOOD PRESSURE: 115 MMHG | BODY MASS INDEX: 26.26 KG/M2

## 2024-05-26 DIAGNOSIS — N20.0 KIDNEY STONE: Primary | ICD-10-CM

## 2024-05-26 LAB
ALBUMIN SERPL BCP-MCNC: 4 G/DL (ref 3.5–5.2)
ALP SERPL-CCNC: 66 U/L (ref 55–135)
ALT SERPL W/O P-5'-P-CCNC: 14 U/L (ref 10–44)
ANION GAP SERPL CALC-SCNC: 13 MMOL/L (ref 8–16)
AST SERPL-CCNC: 31 U/L (ref 10–40)
B-HCG UR QL: NEGATIVE
BACTERIA #/AREA URNS AUTO: ABNORMAL /HPF
BASOPHILS # BLD AUTO: 0.06 K/UL (ref 0–0.2)
BASOPHILS NFR BLD: 0.8 % (ref 0–1.9)
BILIRUB SERPL-MCNC: 0.5 MG/DL (ref 0.1–1)
BILIRUB UR QL STRIP: NEGATIVE
BUN SERPL-MCNC: 9 MG/DL (ref 6–20)
CALCIUM SERPL-MCNC: 9.5 MG/DL (ref 8.7–10.5)
CHLORIDE SERPL-SCNC: 107 MMOL/L (ref 95–110)
CLARITY UR REFRACT.AUTO: ABNORMAL
CO2 SERPL-SCNC: 19 MMOL/L (ref 23–29)
COLOR UR AUTO: ABNORMAL
COLOR UR AUTO: ABNORMAL
COLOR UR AUTO: YELLOW
CREAT SERPL-MCNC: 0.8 MG/DL (ref 0.5–1.4)
CTP QC/QA: YES
DIFFERENTIAL METHOD BLD: ABNORMAL
EOSINOPHIL # BLD AUTO: 0.1 K/UL (ref 0–0.5)
EOSINOPHIL NFR BLD: 1.7 % (ref 0–8)
ERYTHROCYTE [DISTWIDTH] IN BLOOD BY AUTOMATED COUNT: 14.2 % (ref 11.5–14.5)
EST. GFR  (NO RACE VARIABLE): >60 ML/MIN/1.73 M^2
GLUCOSE SERPL-MCNC: 79 MG/DL (ref 70–110)
GLUCOSE UR QL STRIP: NEGATIVE
HCT VFR BLD AUTO: 36.2 % (ref 37–48.5)
HGB BLD-MCNC: 11.3 G/DL (ref 12–16)
HGB UR QL STRIP: ABNORMAL
HYALINE CASTS UR QL AUTO: 0 /LPF
IMM GRANULOCYTES # BLD AUTO: 0.02 K/UL (ref 0–0.04)
IMM GRANULOCYTES NFR BLD AUTO: 0.3 % (ref 0–0.5)
KETONES UR QL STRIP: ABNORMAL
KETONES UR QL STRIP: ABNORMAL
KETONES UR QL STRIP: NEGATIVE
LEUKOCYTE ESTERASE UR QL STRIP: ABNORMAL
LIPASE SERPL-CCNC: 38 U/L (ref 4–60)
LYMPHOCYTES # BLD AUTO: 2.4 K/UL (ref 1–4.8)
LYMPHOCYTES NFR BLD: 30.8 % (ref 18–48)
MCH RBC QN AUTO: 28 PG (ref 27–31)
MCHC RBC AUTO-ENTMCNC: 31.2 G/DL (ref 32–36)
MCV RBC AUTO: 90 FL (ref 82–98)
MICROSCOPIC COMMENT: ABNORMAL
MONOCYTES # BLD AUTO: 0.4 K/UL (ref 0.3–1)
MONOCYTES NFR BLD: 5 % (ref 4–15)
NEUTROPHILS # BLD AUTO: 4.8 K/UL (ref 1.8–7.7)
NEUTROPHILS NFR BLD: 61.4 % (ref 38–73)
NITRITE UR QL STRIP: NEGATIVE
NRBC BLD-RTO: 0 /100 WBC
PH UR STRIP: 6 [PH] (ref 5–8)
PLATELET # BLD AUTO: 279 K/UL (ref 150–450)
PMV BLD AUTO: 11.8 FL (ref 9.2–12.9)
POTASSIUM SERPL-SCNC: 4.7 MMOL/L (ref 3.5–5.1)
PROT SERPL-MCNC: 7.9 G/DL (ref 6–8.4)
PROT UR QL STRIP: ABNORMAL
RBC # BLD AUTO: 4.04 M/UL (ref 4–5.4)
RBC #/AREA URNS AUTO: >100 /HPF (ref 0–4)
SODIUM SERPL-SCNC: 139 MMOL/L (ref 136–145)
SP GR UR STRIP: 1.01 (ref 1–1.03)
SP GR UR STRIP: 1.03 (ref 1–1.03)
SP GR UR STRIP: 1.03 (ref 1–1.03)
SQUAMOUS #/AREA URNS AUTO: 14 /HPF
SQUAMOUS #/AREA URNS AUTO: 6 /HPF
SQUAMOUS #/AREA URNS AUTO: 6 /HPF
URN SPEC COLLECT METH UR: ABNORMAL
WBC # BLD AUTO: 7.85 K/UL (ref 3.9–12.7)
WBC #/AREA URNS AUTO: 13 /HPF (ref 0–5)
WBC #/AREA URNS AUTO: 13 /HPF (ref 0–5)
WBC #/AREA URNS AUTO: 32 /HPF (ref 0–5)

## 2024-05-26 PROCEDURE — 83690 ASSAY OF LIPASE: CPT

## 2024-05-26 PROCEDURE — 80053 COMPREHEN METABOLIC PANEL: CPT

## 2024-05-26 PROCEDURE — 63600175 PHARM REV CODE 636 W HCPCS

## 2024-05-26 PROCEDURE — 96375 TX/PRO/DX INJ NEW DRUG ADDON: CPT

## 2024-05-26 PROCEDURE — 87086 URINE CULTURE/COLONY COUNT: CPT

## 2024-05-26 PROCEDURE — 87077 CULTURE AEROBIC IDENTIFY: CPT

## 2024-05-26 PROCEDURE — 87186 SC STD MICRODIL/AGAR DIL: CPT

## 2024-05-26 PROCEDURE — 81001 URINALYSIS AUTO W/SCOPE: CPT | Mod: 91

## 2024-05-26 PROCEDURE — 81025 URINE PREGNANCY TEST: CPT

## 2024-05-26 PROCEDURE — 96376 TX/PRO/DX INJ SAME DRUG ADON: CPT

## 2024-05-26 PROCEDURE — 25000003 PHARM REV CODE 250

## 2024-05-26 PROCEDURE — 87088 URINE BACTERIA CULTURE: CPT

## 2024-05-26 PROCEDURE — 99285 EMERGENCY DEPT VISIT HI MDM: CPT | Mod: 25

## 2024-05-26 PROCEDURE — 96374 THER/PROPH/DIAG INJ IV PUSH: CPT

## 2024-05-26 PROCEDURE — 85025 COMPLETE CBC W/AUTO DIFF WBC: CPT

## 2024-05-26 PROCEDURE — 96361 HYDRATE IV INFUSION ADD-ON: CPT

## 2024-05-26 RX ORDER — KETOROLAC TROMETHAMINE 10 MG/1
10 TABLET, FILM COATED ORAL EVERY 6 HOURS
Qty: 20 TABLET | Refills: 0 | Status: SHIPPED | OUTPATIENT
Start: 2024-05-26 | End: 2024-05-31

## 2024-05-26 RX ORDER — ONDANSETRON 4 MG/1
4 TABLET, ORALLY DISINTEGRATING ORAL EVERY 6 HOURS PRN
Qty: 12 TABLET | Refills: 0 | Status: SHIPPED | OUTPATIENT
Start: 2024-05-26

## 2024-05-26 RX ORDER — HYDROMORPHONE HYDROCHLORIDE 1 MG/ML
0.5 INJECTION, SOLUTION INTRAMUSCULAR; INTRAVENOUS; SUBCUTANEOUS
Status: COMPLETED | OUTPATIENT
Start: 2024-05-26 | End: 2024-05-26

## 2024-05-26 RX ORDER — KETOROLAC TROMETHAMINE 30 MG/ML
10 INJECTION, SOLUTION INTRAMUSCULAR; INTRAVENOUS
Status: COMPLETED | OUTPATIENT
Start: 2024-05-26 | End: 2024-05-26

## 2024-05-26 RX ORDER — ONDANSETRON HYDROCHLORIDE 2 MG/ML
4 INJECTION, SOLUTION INTRAVENOUS
Status: COMPLETED | OUTPATIENT
Start: 2024-05-26 | End: 2024-05-26

## 2024-05-26 RX ADMIN — KETOROLAC TROMETHAMINE 10 MG: 30 INJECTION, SOLUTION INTRAMUSCULAR at 01:05

## 2024-05-26 RX ADMIN — ONDANSETRON 4 MG: 2 INJECTION INTRAMUSCULAR; INTRAVENOUS at 01:05

## 2024-05-26 RX ADMIN — HYDROMORPHONE HYDROCHLORIDE 0.5 MG: 1 INJECTION, SOLUTION INTRAMUSCULAR; INTRAVENOUS; SUBCUTANEOUS at 03:05

## 2024-05-26 RX ADMIN — SODIUM CHLORIDE 1000 ML: 9 INJECTION, SOLUTION INTRAVENOUS at 01:05

## 2024-05-26 RX ADMIN — HYDROMORPHONE HYDROCHLORIDE 0.5 MG: 1 INJECTION, SOLUTION INTRAMUSCULAR; INTRAVENOUS; SUBCUTANEOUS at 01:05

## 2024-05-26 NOTE — ED NOTES
Patient identifiers verified and correct for Ms Jackson  C/C: Flank pain SEE NN  APPEARANCE: awake and alert in NAD. PAIN  10/10  SKIN: warm, dry and intact. No breakdown or bruising.  MUSCULOSKELETAL: Patient moving all extremities spontaneously, no obvious swelling or deformities noted. Ambulates independently.  RESPIRATORY: Denies shortness of breath.Respirations unlabored.   CARDIAC: Denies CP, 2+ distal pulses; no peripheral edema  ABDOMEN: S/ND/NT, Denies nausea  : voids spontaneously, denies difficulty, reports pain better then got bad again   Neurologic: AAO x 4; follows commands equal strength in all extremities; denies numbness/tingling. Denies dizziness  Denies new weaknesss

## 2024-05-26 NOTE — ED NOTES
Patient states with known kidney stone, reports pain left flank onset last night, ibuprofen today

## 2024-05-26 NOTE — DISCHARGE INSTRUCTIONS
Were offered overnight admission with urology however declined.  Pain may likely continuing to urinate able to pass the stone.  Urology recommendation is to discharge her with Toradol which is an anti-inflammatory.  I have also provided you with nausea medication as well  When urinating please use the strainer to catch the stone and bring to your follow-up urology appointment next week.  If pain worsens and is not controlled at home then you should return to the emergency department

## 2024-05-26 NOTE — PROGRESS NOTES
Urology Progress Note    Marisol Jackson is a 44 y.o. F with PMH of urolithiasis and Crohn's disease who is a bounce back to the ED with a left ureteral stone. She presented on 5/15 and was found to have a 3.5 mm mid ureter. She was sent home on trial of passage.     She presents today with left sided flank pain radiating to vagina. She denies fevers, chills, N/V, dysuria. States pain has improved since arriving to ED.     WBC 7.85. Catheterized UA contaminated, overall non concerning for infection.  Cr 0.8 at baseline    CTRSS with 3.5 mm left distal ureteral stone.     -- I offered patient hospital admission for better pain control and ureteral stent placement. Patient refused stating she required a ureteral stent for 3 weeks after previous stone surgery and could not tolerate it.   -- Patient states she would prefer to go home and follow up in clinic early this week  -- I will send message for outpatient referral early   -- Please discharge with urine strainer   -- Toradol for pain     Urology will now sign off. Please call with any additional questions/concerns.     LUACS Payton MD  Department of Urology  PGY-2  Pager: [phone]

## 2024-05-26 NOTE — Clinical Note
"Marisol Romano" Manuel was seen and treated in our emergency department on 5/26/2024.  She may return to work on 05/28/2024.       If you have any questions or concerns, please don't hesitate to call.      Taylor Rivas RN    "

## 2024-05-26 NOTE — ED PROVIDER NOTES
Encounter Date: 2024       History     Chief Complaint   Patient presents with    Flank Pain     Pt diagnosed with 2 kidney stones. Pt reports flank and abdominal pain.     45 y/o female presented to the emergency department with sudden onset of left flank pain radiating down to the abdomen, which began yesterday. She reports experiencing nausea and a suprapubic pressure sensation. The patient has a history of kidney stones and notes that this pain feels similar to previous episodes. She is currently taking Flomax but does not believe she has passed a stone at this time. The patient denies any fevers, dysuria, or hematuria.      Review of patient's allergies indicates:  No Known Allergies  Past Medical History:   Diagnosis Date    Crohn's disease     Seizures      Past Surgical History:   Procedure Laterality Date    BOWEL RESECTION      CARPAL TUNNEL RELEASE       SECTION      CYSTOSCOPY W/ RETROGRADES N/A 10/28/2022    Procedure: CYSTOSCOPY, WITH RETROGRADE PYELOGRAM;  Surgeon: Delio Posada MD;  Location: [facility] OR;  Service: Urology;  Laterality: N/A;    CYSTOSCOPY W/ URETERAL STENT PLACEMENT Right 10/28/2022    Procedure: CYSTOSCOPY, WITH URETERAL STENT INSERTION;  Surgeon: Delio Posada MD;  Location: [facility] OR;  Service: Urology;  Laterality: Right;    LASER LITHOTRIPSY  10/28/2022    Procedure: LITHOTRIPSY, USING LASER;  Surgeon: Delio Posada MD;  Location: [facility] OR;  Service: Urology;;     No family history on file.  Social History     Tobacco Use    Smoking status: Never    Smokeless tobacco: Never   Substance Use Topics    Alcohol use: Never     Review of Systems  See HPI   Physical Exam     Initial Vitals [24 1253]   BP Pulse Resp Temp SpO2   119/65 68 20 97.7 °F (36.5 °C) 100 %      MAP       --         Physical Exam    Vitals reviewed.  Constitutional: She appears well-developed and well-nourished.   HENT:   Head: Normocephalic and atraumatic.   Eyes: Conjunctivae and  EOM are normal.   Neck:   Normal range of motion.  Cardiovascular:  Normal rate.           Pulmonary/Chest: No respiratory distress.   Abdominal: Abdomen is soft. She exhibits no distension. There is abdominal tenderness (suprapubic). There is no rebound.   Musculoskeletal:         General: Normal range of motion.      Cervical back: Normal range of motion.     Neurological: She is alert and oriented to person, place, and time.   Skin: Skin is warm and dry.   Psychiatric: She has a normal mood and affect. Thought content normal.         ED Course   Procedures  Labs Reviewed   CBC W/ AUTO DIFFERENTIAL - Abnormal; Notable for the following components:       Result Value    Hemoglobin 11.3 (*)     Hematocrit 36.2 (*)     MCHC 31.2 (*)     All other components within normal limits   COMPREHENSIVE METABOLIC PANEL - Abnormal; Notable for the following components:    CO2 19 (*)     All other components within normal limits   URINALYSIS, REFLEX TO URINE CULTURE - Abnormal; Notable for the following components:    Color, UA Orange (*)     Appearance, UA Hazy (*)     Protein, UA 1+ (*)     Occult Blood UA 3+ (*)     Leukocytes, UA Trace (*)     All other components within normal limits    Narrative:     Specimen Source->Urine   URINALYSIS MICROSCOPIC - Abnormal; Notable for the following components:    RBC, UA >100 (*)     WBC, UA 13 (*)     Bacteria Few (*)     All other components within normal limits    Narrative:     Specimen Source->Urine   URINALYSIS, REFLEX TO URINE CULTURE - Abnormal; Notable for the following components:    Color, UA Orange (*)     Appearance, UA Hazy (*)     Protein, UA 1+ (*)     Ketones, UA Trace (*)     Occult Blood UA 3+ (*)     Leukocytes, UA 2+ (*)     All other components within normal limits    Narrative:     Specimen Source->Urine   URINALYSIS MICROSCOPIC - Abnormal; Notable for the following components:    RBC, UA >100 (*)     WBC, UA 32 (*)     All other components within normal limits     Narrative:     Specimen Source->Urine   URINALYSIS, REFLEX TO URINE CULTURE - Abnormal; Notable for the following components:    Appearance, UA Hazy (*)     Protein, UA 1+ (*)     Ketones, UA 1+ (*)     Occult Blood UA 3+ (*)     Leukocytes, UA 2+ (*)     All other components within normal limits    Narrative:     Specimen Source->Urine   URINALYSIS MICROSCOPIC - Abnormal; Notable for the following components:    RBC, UA >100 (*)     WBC, UA 13 (*)     All other components within normal limits    Narrative:     Specimen Source->Urine   CULTURE, URINE   CULTURE, URINE   CULTURE, URINE   LIPASE   POCT URINE PREGNANCY          Imaging Results               CT Renal Stone Study ABD Pelvis WO (Final result)  Result time 05/26/24 14:28:01      Final result by Kirt Bolanos MD (05/26/24 14:28:01)                   Impression:      This report was flagged in Epic as abnormal.    1. Mild left hydroureteronephrosis with perinephric and periureteral inflammation secondary to a 4 mm calculus within the distal aspect of the left ureter.  Correlation with urinalysis recommended to exclude superimposed infection.  There is additional bilateral nonobstructive nephrolithiasis.  2. Surgical changes of the distal ileum noting mild slow flow through the anastomosis without obstruction.  3. Please see above for several additional findings.      Electronically signed by: Kirt Bolanos MD  Date:    05/26/2024  Time:    14:28               Narrative:    EXAMINATION:  CT RENAL STONE STUDY ABD PELVIS WO    CLINICAL HISTORY:  Flank pain, kidney stone suspected;left flank;    TECHNIQUE:  Low dose axial images, sagittal and coronal reformations were obtained from the lung bases to the pubic symphysis.  Contrast was not administered.    COMPARISON:  05/15/2024    FINDINGS:  Images of the lower thorax are remarkable for bilateral dependent atelectasis.    The liver, spleen, pancreas, gallbladder and adrenal glands have a grossly  unremarkable noncontrast appearance noting numerous calcified granuloma throughout the liver and spleen.  The stomach is relatively decompressed without wall thickening.  No significant abdominal lymphadenopathy.    There is bilateral nonobstructive nephrolithiasis noting large calculus within the proximal left renal collecting system.  There is mild left hydroureteronephrosis and left perinephric and periureteral inflammation secondary to a 4 mm calculus within the distal aspect of the left ureter.  No right hydronephrosis.  The right ureter is unremarkable without calculi seen.  The urinary bladder is unremarkable.  The uterus and adnexa are unremarkable.  No significant free fluid in the pelvis.    There is surgical change of distal ileal resection, anastomosis is patent noting some degree of slow flow through the anastomosis.  There are a few scattered shotty periaortic, pericaval, and mesenteric lymph nodes.  No focal organized pelvic fluid collection.  There is a focus of dystrophic calcification at the level of the anterior abdominal wall incision, may reflect fat infarct or surgical change.    There are degenerative changes of the spine.  No significant inguinal lymphadenopathy.  There are surgical changes of the anterior abdominal wall.  There is induration involving the posterior gluteal soft tissues.                                       Medications   HYDROmorphone injection 0.5 mg (0.5 mg Intravenous Given 5/26/24 1347)   ondansetron injection 4 mg (4 mg Intravenous Given 5/26/24 1348)   ketorolac injection 9.999 mg (9.999 mg Intravenous Given 5/26/24 1348)   sodium chloride 0.9% bolus 1,000 mL 1,000 mL (0 mLs Intravenous Stopped 5/26/24 1522)   HYDROmorphone injection 0.5 mg (0.5 mg Intravenous Given 5/26/24 1519)     Medical Decision Making  44-year-old female presents to emergency department with left-sided flank pain with radiation down to the left lower abdomen Differential diagnosis includes  pyelonephritis, nephrolithiasis, infected stone. Lab work did not reveal AMMON ,there are WBCs in UA microscopy however appears contaminated- Urology not concerned for infection. CT remarkable for a 4 mm stone with left mild hydronephrosis.  Urology was consulted - see consult note.  Patient declined overnight observation for stent placement would like to trial passing stone at home.  Discharge with symptom management and return precautions  Pt discussed with supervising physician      Amount and/or Complexity of Data Reviewed  Labs: ordered. Decision-making details documented in ED Course.  Radiology: ordered.    Risk  Prescription drug management.               ED Course as of 05/26/24 1734   Sun May 26, 2024   1655 Urinalysis, Reflex to Urine Culture Urine, Clean Catch [CR]      ED Course User Index  [CR] Hanny Houser PA-C                           Clinical Impression:  Final diagnoses:  [N20.0] Kidney stone (Primary)          ED Disposition Condition    Discharge Stable          ED Prescriptions       Medication Sig Dispense Start Date End Date Auth. Provider    ketorolac (TORADOL) 10 mg tablet Take 1 tablet (10 mg total) by mouth every 6 (six) hours. for 5 days 20 tablet 5/26/2024 5/31/2024 Hanny Houser PA-C    ondansetron (ZOFRAN-ODT) 4 MG TbDL Take 1 tablet (4 mg total) by mouth every 6 (six) hours as needed (nausea). 12 tablet 5/26/2024 -- Hanny Houser PA-C          Follow-up Information       Follow up With Specialties Details Why Contact Info Additional Information    [facility] [address] [facility]rology Schedule an appointment as soon as possible for a visit  ED follow up with kidney stone [address]  [facility] [id number] [phone] Main Building, 4th Floor Please park in [facility] and take [facility] elevator             Hanny Houser PA-C  05/26/24 1738

## 2024-05-27 LAB
BACTERIA UR CULT: NO GROWTH
BACTERIA UR CULT: NORMAL

## 2024-05-28 LAB — BACTERIA UR CULT: ABNORMAL
